# Patient Record
Sex: FEMALE | Race: WHITE | Employment: OTHER | URBAN - METROPOLITAN AREA
[De-identification: names, ages, dates, MRNs, and addresses within clinical notes are randomized per-mention and may not be internally consistent; named-entity substitution may affect disease eponyms.]

---

## 2017-11-24 ENCOUNTER — OFFICE VISIT (OUTPATIENT)
Dept: FAMILY MEDICINE CLINIC | Facility: CLINIC | Age: 72
End: 2017-11-24

## 2017-11-24 VITALS
HEIGHT: 67 IN | TEMPERATURE: 98 F | WEIGHT: 150 LBS | HEART RATE: 74 BPM | RESPIRATION RATE: 18 BRPM | DIASTOLIC BLOOD PRESSURE: 74 MMHG | OXYGEN SATURATION: 98 % | BODY MASS INDEX: 23.54 KG/M2 | SYSTOLIC BLOOD PRESSURE: 130 MMHG

## 2017-11-24 DIAGNOSIS — J06.9 UPPER RESPIRATORY TRACT INFECTION, UNSPECIFIED TYPE: Primary | ICD-10-CM

## 2017-11-24 PROCEDURE — 99202 OFFICE O/P NEW SF 15 MIN: CPT | Performed by: PHYSICIAN ASSISTANT

## 2017-11-24 RX ORDER — CEFDINIR 300 MG/1
300 CAPSULE ORAL 2 TIMES DAILY
Qty: 14 CAPSULE | Refills: 0 | Status: SHIPPED | OUTPATIENT
Start: 2017-11-24 | End: 2017-12-01

## 2017-11-24 RX ORDER — BENZONATATE 200 MG/1
200 CAPSULE ORAL 3 TIMES DAILY PRN
Qty: 20 CAPSULE | Refills: 0 | Status: SHIPPED | OUTPATIENT
Start: 2017-11-24

## 2017-11-24 NOTE — PROGRESS NOTES
CHIEF COMPLAINT:   Patient presents with:  Cough: HA x 1 day, chills      HPI:   Remy Gonzalez is a 67year old female who presents for upper respiratory symptoms for 1 day.    Patient reports headache, chills, dry cough, cough is not keeping pt up at night, SINUSES: no maxillary, no frontal sinus tenderness  EYES: conjunctiva clear, EOM intact  EARS:    Right TM: pearly, WNL bony landmarks sharp   Left TM: pearly, WNL bony landmarks sharp   Canals:  clear  NOSE: no nasal discharge, nasal mucosa pink and non i Comfort care as described in Patient Instructions below. Maria Esther Higuera verbalized understanding of these issues and agreed to the plan. They are asked to follow up with PCP in a week if sx's persist or worsen.     Patient Instructions   Please follow up Throat lozenges or sprays containing benzocaine, phenol, menthol. Gargle with warm salt water. CHILDREN'S IBUPROFEN, 4 tsp (1 capful, which is 400 mg, a usual adult dose) every 4-6 hrs.   This works a bit more immediately for your pain by it's anti-i Stay home from work or school if you have fever, or symptoms below the neck (significant cough, stomach ache/nausea, diarrhea). You are likely to be shedding the most viral particles during this time.       Do not return to work or school until [facility]

## 2024-08-01 NOTE — PATIENT INSTRUCTIONS
Please follow up with your PCP if no improvement within 5-7 days. Go directly to the ER for any acute worsening of symptoms. You appear to have a viral upper respiratory infection. Antibiotics will not help this.   Expect course of illness to be approxim CHILDREN'S IBUPROFEN, 4 tsp (1 capful, which is 400 mg, a usual adult dose) every 4-6 hrs.   This works a bit more immediately for your pain by it's anti-inflammatory effect directly in the throat first.    For cough and congestion:    Dextromethorphan/gua Stay home from work or school if you have fever, or symptoms below the neck (significant cough, stomach ache/nausea, diarrhea). You are likely to be shedding the most viral particles during this time.       Do not return to work or school until [facility] attack. These may include:    Chest pain or pressure, or a strange feeling in the chest.     Sweating.     Shortness of breath.     Pain, pressure, or a strange feeling in the back, neck, jaw, or upper belly or in one or both shoulders or arms.     Lightheadedness or sudden weakness.     A fast or irregular heartbeat.   After you call 911, the  may tell you to chew 1 adult-strength or 2 to 4 low-dose aspirin. Wait for an ambulance. Do not try to drive yourself.  Watch closely for changes in your health, and be sure to contact your doctor if you have any problems.  Where can you learn more?  Go to https://www.[employer].net/patientEd and enter F075 to learn more about \"A Healthy Heart: Care Instructions.\"  Current as of: June 24, 2023  Content Version: 14.1  © 8542-8478 [employer].   Care instructions adapted under license by [employer]. If you have questions about a medical condition or this instruction, always ask your healthcare professional. [employer] disclaims any warranty or liability for your use of this information.      Personalized Preventive Plan for Beth Kuo - 8/1/2024  Medicare offers a range of preventive health benefits. Some of the tests and screenings are paid in full while other may be subject to a deductible, co-insurance, and/or copay.    Some of these benefits include a comprehensive review of your medical history including lifestyle, illnesses that may run in your family, and various assessments and screenings as appropriate.    After reviewing your medical record and screening and assessments performed today your provider may have ordered immunizations, labs, imaging, and/or referrals for you.  A list of these orders (if applicable) as well as your Preventive Care list are included within your After Visit Summary for your review.    Other Preventive Recommendations:    A preventive eye exam performed by an eye specialist is recommended every 1-2 years